# Patient Record
Sex: FEMALE | Race: WHITE | NOT HISPANIC OR LATINO | URBAN - NONMETROPOLITAN AREA
[De-identification: names, ages, dates, MRNs, and addresses within clinical notes are randomized per-mention and may not be internally consistent; named-entity substitution may affect disease eponyms.]

---

## 2023-02-06 ENCOUNTER — APPOINTMENT (RX ONLY)
Dept: URBAN - NONMETROPOLITAN AREA CLINIC 24 | Facility: CLINIC | Age: 33
Setting detail: DERMATOLOGY
End: 2023-02-06

## 2023-02-06 DIAGNOSIS — L40.0 PSORIASIS VULGARIS: ICD-10-CM | Status: INADEQUATELY CONTROLLED

## 2023-02-06 DIAGNOSIS — L70.0 ACNE VULGARIS: ICD-10-CM

## 2023-02-06 DIAGNOSIS — Z79.899 OTHER LONG TERM (CURRENT) DRUG THERAPY: ICD-10-CM

## 2023-02-06 PROCEDURE — ? PRESCRIPTION

## 2023-02-06 PROCEDURE — ? VENIPUNCTURE

## 2023-02-06 PROCEDURE — ? TREATMENT REGIMEN

## 2023-02-06 PROCEDURE — 36415 COLL VENOUS BLD VENIPUNCTURE: CPT

## 2023-02-06 PROCEDURE — ? ADDITIONAL NOTES

## 2023-02-06 PROCEDURE — ? COUNSELING

## 2023-02-06 PROCEDURE — 99204 OFFICE O/P NEW MOD 45 MIN: CPT

## 2023-02-06 PROCEDURE — ? ORDER TESTS

## 2023-02-06 PROCEDURE — ? SKYRIZI INITIATION

## 2023-02-06 RX ORDER — CLOBETASOL PROPIONATE 0.5 MG/ML
SOLUTION TOPICAL
Qty: 50 | Refills: 3 | Status: ERX | COMMUNITY
Start: 2023-02-06

## 2023-02-06 RX ORDER — CLINDAMYCIN PHOSPHATE AND TRETINOIN 10; .25 MG/G; MG/G
GEL TOPICAL
Qty: 60 | Refills: 6 | Status: ERX | COMMUNITY
Start: 2023-02-06

## 2023-02-06 RX ADMIN — CLINDAMYCIN PHOSPHATE AND TRETINOIN: 10; .25 GEL TOPICAL at 00:00

## 2023-02-06 RX ADMIN — CLOBETASOL PROPIONATE: 0.5 SOLUTION TOPICAL at 00:00

## 2023-02-06 ASSESSMENT — LOCATION DETAILED DESCRIPTION DERM
LOCATION DETAILED: RIGHT INFERIOR TEMPLE
LOCATION DETAILED: LEFT ELBOW
LOCATION DETAILED: LEFT SUPERIOR PREAURICULAR CHEEK
LOCATION DETAILED: LEFT ANTERIOR DISTAL UPPER ARM
LOCATION DETAILED: RIGHT DISTAL POSTERIOR UPPER ARM
LOCATION DETAILED: LEFT CHIN
LOCATION DETAILED: LEFT SUPERIOR PARIETAL SCALP

## 2023-02-06 ASSESSMENT — LOCATION SIMPLE DESCRIPTION DERM
LOCATION SIMPLE: LEFT UPPER ARM
LOCATION SIMPLE: CHIN
LOCATION SIMPLE: SCALP
LOCATION SIMPLE: LEFT ELBOW
LOCATION SIMPLE: LEFT CHEEK
LOCATION SIMPLE: RIGHT TEMPLE
LOCATION SIMPLE: RIGHT POSTERIOR UPPER ARM

## 2023-02-06 ASSESSMENT — SEVERITY ASSESSMENT OVERALL AMONG ALL PATIENTS
IN YOUR EXPERIENCE, AMONG ALL PATIENTS YOU HAVE SEEN WITH THIS CONDITION, HOW SEVERE IS THIS PATIENT'S CONDITION?: FEW INFLAMMATORY LESIONS, SOME NONINFLAMMATORY

## 2023-02-06 ASSESSMENT — LOCATION ZONE DERM
LOCATION ZONE: SCALP
LOCATION ZONE: FACE
LOCATION ZONE: ARM

## 2023-02-06 ASSESSMENT — BSA PSORIASIS: % BODY COVERED IN PSORIASIS: 12

## 2023-02-06 ASSESSMENT — PGA PSORIASIS: PGA PSORIASIS 2020: MODERATE

## 2023-02-06 ASSESSMENT — ITCH NUMERIC RATING SCALE: HOW SEVERE IS YOUR ITCHING?: 8

## 2023-02-06 NOTE — PROCEDURE: TREATMENT REGIMEN
Hide Vanicream Products: No
Action 4: Continue
Other Instructions: Patient will bring home care regimen with her at the next office visit
Start Regimen: Veltin 1.2 %-0.025 % topical gelApply to face QHS
Detail Level: Zone
Start Regimen: clobetasol 0.05 % scalp solution Apply to scalp daily. May use on elbows

## 2023-02-06 NOTE — PROCEDURE: SKYRIZI INITIATION
Is Soriatane Contraindicated?: Yes
Skyrizi Monitoring Guidelines: A yearly test for tuberculosis is required while taking Skyrizi.
Skyrizi Dosing: 150 mg SC week 0 and week 4 then every 12 weeks thereafter
Add Pregnancy And Lactation Warning To Medication Counseling?: No
Pregnancy And Lactation Warning Text: The risk during pregnancy and breastfeeding is uncertain with this medication.
Diagnosis (Required): Psoriasis
Detail Level: Zone

## 2023-02-06 NOTE — PROCEDURE: ORDER TESTS
Performing Laboratory: 0
Expected Date Of Service: 02/06/2023
Billing Type: Third-Party Bill
Bill For Surgical Tray: no

## 2023-02-06 NOTE — PROCEDURE: ADDITIONAL NOTES
Detail Level: Simple
Additional Notes: Patient filled out skyrizi application during visit, hand delivered to biologic nurse during clinic.
Render Risk Assessment In Note?: no

## 2023-02-10 ENCOUNTER — RX ONLY (OUTPATIENT)
Age: 33
Setting detail: RX ONLY
End: 2023-02-10

## 2023-02-10 RX ORDER — RISANKIZUMAB-RZAA 150 MG/ML
INJECTION SUBCUTANEOUS
Qty: 1 | Refills: 4 | Status: ERX | COMMUNITY
Start: 2023-02-10

## 2023-02-28 ENCOUNTER — APPOINTMENT (RX ONLY)
Dept: URBAN - NONMETROPOLITAN AREA CLINIC 24 | Facility: CLINIC | Age: 33
Setting detail: DERMATOLOGY
End: 2023-02-28

## 2023-02-28 DIAGNOSIS — L40.0 PSORIASIS VULGARIS: ICD-10-CM

## 2023-02-28 PROCEDURE — ? COUNSELING

## 2023-02-28 PROCEDURE — ? SKYRIZI INJECTION

## 2023-02-28 PROCEDURE — 96372 THER/PROPH/DIAG INJ SC/IM: CPT

## 2023-02-28 ASSESSMENT — LOCATION SIMPLE DESCRIPTION DERM: LOCATION SIMPLE: ABDOMEN

## 2023-02-28 ASSESSMENT — LOCATION ZONE DERM: LOCATION ZONE: TRUNK

## 2023-02-28 ASSESSMENT — LOCATION DETAILED DESCRIPTION DERM: LOCATION DETAILED: PERIUMBILICAL SKIN

## 2023-02-28 NOTE — PROCEDURE: SKYRIZI INJECTION
Ndc (150mg/Ml Syringe): 9025-5419-84
Ndc (75mg/0.83ml Syringe): [address]-90
Syringe Size Used (Required For Enhanced Ndc): 150 mg/ml Auto-Injector
Hide Non-Enhanced Ndc Variable: No
Use Enhanced Ndc?: Yes
Ndc (150mg/Ml Auto-Injector): 4435-3022-93
Administered By (Optional): Asha Yeung LVN
Detail Level: None
Skyrizi Amount: 150 mg
J-Code: 
78867 Billing Preferences: By Number of Body Touches
Consent: The risks of pain and injection site reactions were reviewed with the patient prior to the injection.

## 2023-03-06 ENCOUNTER — RX ONLY (OUTPATIENT)
Age: 33
Setting detail: RX ONLY
End: 2023-03-06

## 2023-03-06 RX ORDER — RISANKIZUMAB-RZAA 150 MG/ML
INJECTION SUBCUTANEOUS
Qty: 1 | Refills: 4 | Status: ERX

## 2024-01-08 ENCOUNTER — APPOINTMENT (RX ONLY)
Dept: URBAN - NONMETROPOLITAN AREA CLINIC 43 | Facility: CLINIC | Age: 34
Setting detail: DERMATOLOGY
End: 2024-01-08

## 2024-01-08 DIAGNOSIS — Z79.899 OTHER LONG TERM (CURRENT) DRUG THERAPY: ICD-10-CM

## 2024-01-08 DIAGNOSIS — L40.0 PSORIASIS VULGARIS: ICD-10-CM | Status: IMPROVED

## 2024-01-08 PROCEDURE — ? ADDITIONAL NOTES

## 2024-01-08 PROCEDURE — 99214 OFFICE O/P EST MOD 30 MIN: CPT

## 2024-01-08 PROCEDURE — ? TREATMENT REGIMEN

## 2024-01-08 PROCEDURE — ? ORDER TESTS

## 2024-01-08 PROCEDURE — ? SKYRIZI MONITORING

## 2024-01-08 PROCEDURE — ? COUNSELING

## 2024-01-08 ASSESSMENT — LOCATION ZONE DERM
LOCATION ZONE: FACE
LOCATION ZONE: SCALP
LOCATION ZONE: ARM

## 2024-01-08 ASSESSMENT — PGA PSORIASIS: PGA PSORIASIS 2020: ALMOST CLEAR

## 2024-01-08 ASSESSMENT — LOCATION DETAILED DESCRIPTION DERM
LOCATION DETAILED: LEFT SUPERIOR PREAURICULAR CHEEK
LOCATION DETAILED: RIGHT DISTAL POSTERIOR UPPER ARM
LOCATION DETAILED: LEFT SUPERIOR PARIETAL SCALP
LOCATION DETAILED: RIGHT INFERIOR TEMPLE
LOCATION DETAILED: LEFT ELBOW

## 2024-01-08 ASSESSMENT — LOCATION SIMPLE DESCRIPTION DERM
LOCATION SIMPLE: RIGHT POSTERIOR UPPER ARM
LOCATION SIMPLE: SCALP
LOCATION SIMPLE: RIGHT TEMPLE
LOCATION SIMPLE: LEFT ELBOW
LOCATION SIMPLE: LEFT CHEEK

## 2024-01-08 ASSESSMENT — ITCH NUMERIC RATING SCALE: HOW SEVERE IS YOUR ITCHING?: 1

## 2024-01-08 ASSESSMENT — BSA PSORIASIS: % BODY COVERED IN PSORIASIS: 1

## 2024-01-08 NOTE — PROCEDURE: ADDITIONAL NOTES
Detail Level: Simple
Render Risk Assessment In Note?: no
Additional Notes: Lab slip given to patient.

## 2024-01-08 NOTE — PROCEDURE: MIPS QUALITY
Quality 226: Preventive Care And Screening: Tobacco Use: Screening And Cessation Intervention: Patient screened for tobacco use and is an ex/non-smoker
Quality 431: Preventive Care And Screening: Unhealthy Alcohol Use - Screening: Patient not identified as an unhealthy alcohol user when screened for unhealthy alcohol use using a systematic screening method
Quality 130: Documentation Of Current Medications In The Medical Record: Current Medications Documented
Detail Level: Zone
Quality 47: Advance Care Plan: Advance Care Planning discussed and documented in the medical record; patient did not wish or was not able to name a surrogate decision maker or provide an advance care plan.

## 2024-01-08 NOTE — PROCEDURE: ORDER TESTS
Performing Laboratory: 0
Billing Type: Third-Party Bill
Bill For Surgical Tray: no
Expected Date Of Service: 01/08/2024

## 2024-02-28 ENCOUNTER — RX ONLY (OUTPATIENT)
Age: 34
Setting detail: RX ONLY
End: 2024-02-28

## 2024-02-28 RX ORDER — MOMETASONE FUROATE 1 MG/G
CREAM TOPICAL
Qty: 45 | Refills: 0 | Status: ERX | COMMUNITY
Start: 2024-02-28

## 2024-07-11 ENCOUNTER — APPOINTMENT (RX ONLY)
Dept: URBAN - NONMETROPOLITAN AREA CLINIC 43 | Facility: CLINIC | Age: 34
Setting detail: DERMATOLOGY
End: 2024-07-11

## 2024-07-11 ENCOUNTER — RX ONLY (OUTPATIENT)
Age: 34
Setting detail: RX ONLY
End: 2024-07-11

## 2024-07-11 DIAGNOSIS — Z79.899 OTHER LONG TERM (CURRENT) DRUG THERAPY: ICD-10-CM

## 2024-07-11 DIAGNOSIS — L40.0 PSORIASIS VULGARIS: ICD-10-CM | Status: IMPROVED

## 2024-07-11 PROCEDURE — ? ORDER TESTS

## 2024-07-11 PROCEDURE — ? SKYRIZI MONITORING

## 2024-07-11 PROCEDURE — ? ADDITIONAL NOTES

## 2024-07-11 PROCEDURE — ? TREATMENT REGIMEN

## 2024-07-11 PROCEDURE — ? COUNSELING

## 2024-07-11 PROCEDURE — 99213 OFFICE O/P EST LOW 20 MIN: CPT

## 2024-07-11 RX ORDER — RISANKIZUMAB-RZAA 150 MG/ML
INJECTION SUBCUTANEOUS
Qty: 1 | Refills: 3 | Status: ERX

## 2024-07-11 ASSESSMENT — ITCH NUMERIC RATING SCALE: HOW SEVERE IS YOUR ITCHING?: 0

## 2024-07-11 ASSESSMENT — LOCATION DETAILED DESCRIPTION DERM
LOCATION DETAILED: RIGHT DISTAL POSTERIOR UPPER ARM
LOCATION DETAILED: LEFT SUPERIOR PREAURICULAR CHEEK
LOCATION DETAILED: LEFT SUPERIOR PARIETAL SCALP
LOCATION DETAILED: LEFT ELBOW
LOCATION DETAILED: RIGHT INFERIOR TEMPLE

## 2024-07-11 ASSESSMENT — PGA PSORIASIS: PGA PSORIASIS 2020: CLEAR

## 2024-07-11 ASSESSMENT — BSA PSORIASIS: % BODY COVERED IN PSORIASIS: 0

## 2024-07-11 ASSESSMENT — LOCATION SIMPLE DESCRIPTION DERM
LOCATION SIMPLE: LEFT ELBOW
LOCATION SIMPLE: RIGHT TEMPLE
LOCATION SIMPLE: RIGHT POSTERIOR UPPER ARM
LOCATION SIMPLE: SCALP
LOCATION SIMPLE: LEFT CHEEK

## 2024-07-11 ASSESSMENT — LOCATION ZONE DERM
LOCATION ZONE: FACE
LOCATION ZONE: ARM
LOCATION ZONE: SCALP

## 2024-07-11 NOTE — PROCEDURE: ADDITIONAL NOTES
Detail Level: Simple
Render Risk Assessment In Note?: no
Additional Notes: Lab slip emailed to patient.